# Patient Record
Sex: FEMALE | Race: WHITE | NOT HISPANIC OR LATINO | Employment: FULL TIME | URBAN - METROPOLITAN AREA
[De-identification: names, ages, dates, MRNs, and addresses within clinical notes are randomized per-mention and may not be internally consistent; named-entity substitution may affect disease eponyms.]

---

## 2017-11-01 ENCOUNTER — HOSPITAL ENCOUNTER (EMERGENCY)
Facility: HOSPITAL | Age: 47
Discharge: HOME | End: 2017-11-01
Payer: COMMERCIAL

## 2017-11-01 VITALS
SYSTOLIC BLOOD PRESSURE: 155 MMHG | HEIGHT: 70 IN | HEART RATE: 90 BPM | TEMPERATURE: 98 F | OXYGEN SATURATION: 98 % | BODY MASS INDEX: 30.92 KG/M2 | DIASTOLIC BLOOD PRESSURE: 87 MMHG | WEIGHT: 216 LBS | RESPIRATION RATE: 20 BRPM

## 2017-11-01 DIAGNOSIS — R03.0 ELEVATED BLOOD PRESSURE READING: ICD-10-CM

## 2017-11-01 DIAGNOSIS — R00.2 PALPITATIONS: ICD-10-CM

## 2017-11-01 DIAGNOSIS — R07.89 CHEST DISCOMFORT: Primary | ICD-10-CM

## 2017-11-01 DIAGNOSIS — E88.89 KETOSIS: ICD-10-CM

## 2017-11-01 LAB
ALBUMIN SERPL BCP-MCNC: 4.3 G/DL
ALP SERPL-CCNC: 85 U/L
ALT SERPL W/O P-5'-P-CCNC: 39 U/L
ANION GAP SERPL CALC-SCNC: 11 MMOL/L
AST SERPL-CCNC: 39 U/L
B-OH-BUTYR BLD STRIP-SCNC: 2.4 MMOL/L
BASOPHILS # BLD AUTO: 0.01 K/UL
BASOPHILS NFR BLD: 0.1 %
BILIRUB SERPL-MCNC: 1.1 MG/DL
BNP SERPL-MCNC: 18 PG/ML
BUN SERPL-MCNC: 12 MG/DL
CALCIUM SERPL-MCNC: 10.4 MG/DL
CHLORIDE SERPL-SCNC: 105 MMOL/L
CK SERPL-CCNC: 197 U/L
CO2 SERPL-SCNC: 22 MMOL/L
CREAT SERPL-MCNC: 0.9 MG/DL
DIFFERENTIAL METHOD: ABNORMAL
EOSINOPHIL # BLD AUTO: 0.1 K/UL
EOSINOPHIL NFR BLD: 1.1 %
ERYTHROCYTE [DISTWIDTH] IN BLOOD BY AUTOMATED COUNT: 13.1 %
EST. GFR  (AFRICAN AMERICAN): >60 ML/MIN/1.73 M^2
EST. GFR  (NON AFRICAN AMERICAN): >60 ML/MIN/1.73 M^2
GLUCOSE SERPL-MCNC: 84 MG/DL
HCT VFR BLD AUTO: 43.8 %
HGB BLD-MCNC: 14.1 G/DL
LYMPHOCYTES # BLD AUTO: 2 K/UL
LYMPHOCYTES NFR BLD: 18.4 %
MCH RBC QN AUTO: 31.1 PG
MCHC RBC AUTO-ENTMCNC: 32.2 G/DL
MCV RBC AUTO: 97 FL
MONOCYTES # BLD AUTO: 0.7 K/UL
MONOCYTES NFR BLD: 6.8 %
NEUTROPHILS # BLD AUTO: 7.9 K/UL
NEUTROPHILS NFR BLD: 73.6 %
PLATELET # BLD AUTO: 354 K/UL
PMV BLD AUTO: 9.4 FL
POTASSIUM SERPL-SCNC: 4.2 MMOL/L
PROT SERPL-MCNC: 7.8 G/DL
RBC # BLD AUTO: 4.53 M/UL
SODIUM SERPL-SCNC: 138 MMOL/L
TROPONIN I SERPL DL<=0.01 NG/ML-MCNC: 0.01 NG/ML
TSH SERPL DL<=0.005 MIU/L-ACNC: 1.61 UIU/ML
WBC # BLD AUTO: 10.78 K/UL

## 2017-11-01 PROCEDURE — 93010 ELECTROCARDIOGRAM REPORT: CPT | Mod: ,,, | Performed by: INTERNAL MEDICINE

## 2017-11-01 PROCEDURE — 85025 COMPLETE CBC W/AUTO DIFF WBC: CPT

## 2017-11-01 PROCEDURE — 84443 ASSAY THYROID STIM HORMONE: CPT

## 2017-11-01 PROCEDURE — 25000003 PHARM REV CODE 250: Performed by: PHYSICIAN ASSISTANT

## 2017-11-01 PROCEDURE — 99284 EMERGENCY DEPT VISIT MOD MDM: CPT | Mod: 25

## 2017-11-01 PROCEDURE — 84484 ASSAY OF TROPONIN QUANT: CPT

## 2017-11-01 PROCEDURE — 83880 ASSAY OF NATRIURETIC PEPTIDE: CPT

## 2017-11-01 PROCEDURE — 80053 COMPREHEN METABOLIC PANEL: CPT

## 2017-11-01 PROCEDURE — 93005 ELECTROCARDIOGRAM TRACING: CPT

## 2017-11-01 PROCEDURE — 82010 KETONE BODYS QUAN: CPT

## 2017-11-01 PROCEDURE — 82550 ASSAY OF CK (CPK): CPT

## 2017-11-01 PROCEDURE — 96360 HYDRATION IV INFUSION INIT: CPT

## 2017-11-01 RX ORDER — MULTIVIT WITH MINERALS/HERBS
1 TABLET ORAL DAILY
COMMUNITY

## 2017-11-01 RX ORDER — LEVOTHYROXINE SODIUM 150 UG/1
150 TABLET ORAL DAILY
COMMUNITY
End: 2023-07-28 | Stop reason: SDUPTHER

## 2017-11-01 RX ORDER — ROSUVASTATIN CALCIUM 10 MG/1
10 TABLET, COATED ORAL DAILY
COMMUNITY
End: 2022-06-30

## 2017-11-01 RX ADMIN — SODIUM CHLORIDE 1000 ML: 0.9 INJECTION, SOLUTION INTRAVENOUS at 07:11

## 2017-11-01 NOTE — ED PROVIDER NOTES
"SCRIBE #1 NOTE: I, Lorene Abraham, am scribing for, and in the presence of, OLE Blount. I have scribed the entire note.      History      Chief Complaint   Patient presents with    Palpitations     pt states she can feel her heart beating in her chest for past few days        Review of patient's allergies indicates:   Allergen Reactions    Pcn [penicillins]         HPI   HPI    11/1/2017, 5:54 PM   History obtained from the patient      History of Present Illness: Reina Pelaez is a 47 y.o. female patient who presents to the Emergency Department for palpitations which onset gradually 2 days ago. Symptoms are episodic and moderate in severity. Pt states "I feel the pulses in my neck and chest. It feels like a flurry." Episode lasts for seconds. Pt denies taking HTN medication. Pt notes that she drinks a gallon of water, 2 cups of coffee in the AM, and 2 diet sodas in the afternoon. Pt reports being on a no carb diet for 30 days and has been taking Eduardo electrolyte supplement. Pt states that she lost 16 lbs in the last 30 days. No mitigating or exacerbating factors reported. No associated sxs. Patient denies any fever, n/v/d, leg swelling, CP, SOB, chest tightness, HA, dizziness, weakness, numbness, lightheaded, syncope, and all other sxs at this time. No further complaints or concerns at this time.       Arrival mode: Personal vehicle    PCP: BETO Mon Jr, NP       Past Medical History:  Past Medical History:   Diagnosis Date    Alopecia     Hashimoto's disease     Hypercholesterolemia     Hypothyroid     Prediabetes        Past Surgical History:  Past Surgical History:   Procedure Laterality Date    ANTERIOR CRUCIATE LIGAMENT REPAIR Left     LASIK           Family History:  Family History   Problem Relation Age of Onset    Heart attacks under age 50 Paternal Grandfather        Social History:  Social History     Social History Main Topics    Smoking status: Never Smoker    Smokeless " tobacco: Never Used    Alcohol use No    Drug use: No    Sexual activity: Not given       ROS   Review of Systems   Constitutional: Negative for fever.   HENT: Negative for sore throat.    Respiratory: Negative for chest tightness and shortness of breath.    Cardiovascular: Positive for palpitations. Negative for chest pain and leg swelling.   Gastrointestinal: Negative for abdominal pain, blood in stool, constipation, diarrhea, nausea and vomiting.   Genitourinary: Negative for decreased urine volume, difficulty urinating, dysuria, flank pain and hematuria.   Musculoskeletal: Negative for back pain.   Skin: Negative for rash.   Neurological: Negative for dizziness, syncope, weakness, light-headedness, numbness and headaches.   Hematological: Does not bruise/bleed easily.       Physical Exam      Initial Vitals [11/01/17 1702]   BP Pulse Resp Temp SpO2   (!) 167/115 95 16 98.3 °F (36.8 °C) 100 %      MAP       132.33          Physical Exam  Nursing Notes and Vital Signs Reviewed.  Constitutional: Patient is in no acute distress. Well-developed and well-nourished.  Head: Atraumatic. Normocephalic.  Eyes: PERRL. EOM intact. Conjunctivae are not pale. No scleral icterus.  ENT: Mucous membranes are moist. Oropharynx is clear and symmetric.    Neck: Supple. Full ROM. No lymphadenopathy.  Cardiovascular: Regular rate. Regular rhythm. No murmurs, rubs, or gallops. Distal pulses are 2+ and symmetric.  Pulmonary/Chest: No respiratory distress. Clear to auscultation bilaterally. No wheezing, rales, or rhonchi.  Musculoskeletal: Moves all extremities. No obvious deformities. No edema. No calf tenderness.  Skin: Warm and dry.  Neurological:  Alert, awake, and appropriate.  Normal speech.  No acute focal neurological deficits are appreciated.  Psychiatric: Normal affect. Good eye contact. Appropriate in content.    ED Course    Procedures  ED Vital Signs:  Vitals:    11/01/17 1702 11/01/17 1900   BP: (!) 167/115 (!) 157/92  "  Pulse: 95 92   Resp: 16 20   Temp: 98.3 °F (36.8 °C)    TempSrc: Oral    SpO2: 100% 99%   Weight: 98 kg (216 lb)    Height: 5' 10" (1.778 m)        Lab Results:  Labs Reviewed   CBC W/ AUTO DIFFERENTIAL - Abnormal; Notable for the following:        Result Value    MCH 31.1 (*)     Platelets 354 (*)     Gran # 7.9 (*)     Gran% 73.6 (*)     All other components within normal limits   COMPREHENSIVE METABOLIC PANEL - Abnormal; Notable for the following:     CO2 22 (*)     Total Bilirubin 1.1 (*)     All other components within normal limits   CK - Abnormal; Notable for the following:      (*)     All other components within normal limits   BETA - HYDROXYBUTYRATE, SERUM - Abnormal; Notable for the following:     Beta-Hydroxybutyrate 2.4 (*)     All other components within normal limits   TROPONIN I   B-TYPE NATRIURETIC PEPTIDE   TSH     Results for orders placed or performed during the hospital encounter of 11/01/17   CBC auto differential   Result Value Ref Range    WBC 10.78 3.90 - 12.70 K/uL    RBC 4.53 4.00 - 5.40 M/uL    Hemoglobin 14.1 12.0 - 16.0 g/dL    Hematocrit 43.8 37.0 - 48.5 %    MCV 97 82 - 98 fL    MCH 31.1 (H) 27.0 - 31.0 pg    MCHC 32.2 32.0 - 36.0 g/dL    RDW 13.1 11.5 - 14.5 %    Platelets 354 (H) 150 - 350 K/uL    MPV 9.4 9.2 - 12.9 fL    Gran # 7.9 (H) 1.8 - 7.7 K/uL    Lymph # 2.0 1.0 - 4.8 K/uL    Mono # 0.7 0.3 - 1.0 K/uL    Eos # 0.1 0.0 - 0.5 K/uL    Baso # 0.01 0.00 - 0.20 K/uL    Gran% 73.6 (H) 38.0 - 73.0 %    Lymph% 18.4 18.0 - 48.0 %    Mono% 6.8 4.0 - 15.0 %    Eosinophil% 1.1 0.0 - 8.0 %    Basophil% 0.1 0.0 - 1.9 %    Differential Method Automated    Comprehensive metabolic panel   Result Value Ref Range    Sodium 138 136 - 145 mmol/L    Potassium 4.2 3.5 - 5.1 mmol/L    Chloride 105 95 - 110 mmol/L    CO2 22 (L) 23 - 29 mmol/L    Glucose 84 70 - 110 mg/dL    BUN, Bld 12 6 - 20 mg/dL    Creatinine 0.9 0.5 - 1.4 mg/dL    Calcium 10.4 8.7 - 10.5 mg/dL    Total Protein 7.8 6.0 - " "8.4 g/dL    Albumin 4.3 3.5 - 5.2 g/dL    Total Bilirubin 1.1 (H) 0.1 - 1.0 mg/dL    Alkaline Phosphatase 85 55 - 135 U/L    AST 39 10 - 40 U/L    ALT 39 10 - 44 U/L    Anion Gap 11 8 - 16 mmol/L    eGFR if African American >60 >60 mL/min/1.73 m^2    eGFR if non African American >60 >60 mL/min/1.73 m^2   Troponin I   Result Value Ref Range    Troponin I 0.006 0.000 - 0.026 ng/mL   CPK   Result Value Ref Range     (H) 20 - 180 U/L   Beta - Hydroxybutyrate, Serum   Result Value Ref Range    Beta-Hydroxybutyrate 2.4 (H) 0.0 - 0.5 mmol/L   Brain natriuretic peptide   Result Value Ref Range    BNP 18 0 - 99 pg/mL   TSH   Result Value Ref Range    TSH 1.615 0.400 - 4.000 uIU/mL     Vitals:    11/01/17 1702 11/01/17 1900   BP: (!) 167/115 (!) 157/92   BP Location: Right arm    Patient Position: Sitting    Pulse: 95 92   Resp: 16 20   Temp: 98.3 °F (36.8 °C)    TempSrc: Oral    SpO2: 100% 99%   Weight: 98 kg (216 lb)    Height: 5' 10" (1.778 m)          Imaging Results:  Imaging Results          X-Ray Chest PA And Lateral (Final result)  Result time 11/01/17 17:28:12    Final result by Ector Crawford Jr., MD (11/01/17 17:28:12)                 Impression:          No acute findings       Electronically signed by: ECTOR CRAWFORD MD  Date:     11/01/17  Time:    17:28              Narrative:    EXAM:   XR CHEST PA AND LATERAL    CLINICAL HISTORY:   Other chest pain    COMPARISON:  None    FINDINGS:   Heart size and pulmonary vascularity appear normal. Lungs are well aerated and appear clear. No suspicious mass, infiltrate or effusion.  1.2 cm Benign-appearing posterior left lower lobe calcification.                             The EKG was ordered, reviewed, and independently interpreted by the ED provider.  Interpretation time: 1710  Rate: 77 BPM  Rhythm: normal sinus rhythm  Interpretation: No STEMI.         The Emergency Provider reviewed the vital signs and test results, which are outlined above.    ED Discussion "     7:29 PM: Reassessed pt at this time. Discussed with pt all pertinent ED information and results. Discussed pt dx and plan of tx. Gave pt all f/u and return to the ED instructions. All questions and concerns were addressed at this time. Pt expresses understanding of information and instructions, and is comfortable with plan to discharge. Pt is stable for discharge.      Pre-hypertension/Hypertension: The pt has been informed that they may have pre-hypertension or hypertension based on a blood pressure reading in the ED. I recommend that the pt call the PCP listed on their discharge instructions or a physician of their choice this week to arrange f/u for further evaluation of possible pre-hypertension or hypertension.     ED Medication(s):  Medications   sodium chloride 0.9% bolus 1,000 mL (1,000 mLs Intravenous New Bag 11/1/17 1933)       New Prescriptions    No medications on file       Follow-up Information     Schedule an appointment as soon as possible for a visit  with [facility].    Specialty:  Cardiology  Why:  Follow up with [facility] in the next 1-2 days for re-evaluation and further management. Discontinue Caffeine until cardiology evaluation.  Contact information:  [address] [facility] [address] [phone]  Additional information:  (off [facility]) 3rd floor           [facility] - .    Specialty:  Emergency Medicine  Why:  If symptoms worsen in any way.  Contact information:  [address] [facility] [address] [phone]                   Medical Decision Making    Medical Decision Making:   Clinical Tests:   Lab Tests: Reviewed and Ordered  Radiological Study: Reviewed and Ordered  Medical Tests: Reviewed and Ordered           Scribe Attestation:   Scribe #1: I performed the above scribed service and the documentation accurately describes the services I performed. I attest to the accuracy of the note.    Attending:    Physician Attestation Statement for Scribe #1: I, OLE Blount, personally performed the services described in this documentation, as scribed by Lorene Abraham, in my presence, and it is both accurate and complete.          Clinical Impression       ICD-10-CM ICD-9-CM   1. Chest discomfort R07.89 786.59   2. Palpitations R00.2 785.1   3. Elevated blood pressure reading R03.0 796.2   4. Ketosis E88.89 276.2       Disposition:   Disposition: Discharged  Condition: Stable         Jimmy Alvarado PA-C  11/01/17 2003